# Patient Record
Sex: FEMALE | Race: WHITE | Employment: UNEMPLOYED | URBAN - METROPOLITAN AREA
[De-identification: names, ages, dates, MRNs, and addresses within clinical notes are randomized per-mention and may not be internally consistent; named-entity substitution may affect disease eponyms.]

---

## 2020-07-06 ENCOUNTER — HOSPITAL ENCOUNTER (OUTPATIENT)
Dept: MAMMOGRAPHY | Age: 40
Discharge: HOME | End: 2020-07-06
Attending: PHYSICIAN ASSISTANT
Payer: COMMERCIAL

## 2020-07-06 DIAGNOSIS — Z12.31 VISIT FOR SCREENING MAMMOGRAM: ICD-10-CM

## 2020-07-06 PROCEDURE — 77063 BREAST TOMOSYNTHESIS BI: CPT

## 2020-07-09 ENCOUNTER — HOSPITAL ENCOUNTER (OUTPATIENT)
Dept: MAMMOGRAPHY | Age: 40
Discharge: HOME | End: 2020-07-09
Attending: PHYSICIAN ASSISTANT

## 2020-07-09 DIAGNOSIS — R92.8 ABNORMAL MAMMOGRAM OF RIGHT BREAST: ICD-10-CM

## 2020-07-09 DIAGNOSIS — R92.8 ABNORMAL MAMMOGRAM: ICD-10-CM

## 2020-07-09 PROCEDURE — 76642 ULTRASOUND BREAST LIMITED: CPT

## 2020-07-09 PROCEDURE — 77065 DX MAMMO INCL CAD UNI: CPT

## 2021-01-29 ENCOUNTER — HOSPITAL ENCOUNTER (OUTPATIENT)
Dept: MAMMOGRAPHY | Age: 41
Discharge: HOME | End: 2021-01-29
Attending: PHYSICIAN ASSISTANT

## 2021-01-29 DIAGNOSIS — R92.8 ABNORMAL MAMMOGRAM OF RIGHT BREAST: ICD-10-CM

## 2021-01-29 PROCEDURE — 76642 ULTRASOUND BREAST LIMITED: CPT

## 2024-04-03 ENCOUNTER — OFFICE VISIT (OUTPATIENT)
Age: 44
End: 2024-04-03
Payer: COMMERCIAL

## 2024-04-03 DIAGNOSIS — N63.10 MASS OF RIGHT BREAST, UNSPECIFIED QUADRANT: Primary | ICD-10-CM

## 2024-04-03 PROCEDURE — 19001 PUNCTURE ASPIR CYST BRST EA: CPT | Performed by: SURGERY

## 2024-04-03 PROCEDURE — 19000 PUNCTURE ASPIR CYST BREAST: CPT | Performed by: SURGERY

## 2024-04-03 PROCEDURE — 76642 ULTRASOUND BREAST LIMITED: CPT | Performed by: SURGERY

## 2024-04-03 PROCEDURE — 99203 OFFICE O/P NEW LOW 30 MIN: CPT | Performed by: SURGERY

## 2024-04-03 NOTE — PROGRESS NOTES
HISTORY OF PRESENT ILLNESS  Malissa Jaramillo is a 43 y.o. female     HPI NEW patient consult referred by  Dr. Schumacher for RIGHT breast mass. Patient noticed mass 2 weeks ago with heaviness and full feeling. She states her menstrual cycle came early, after cycle lump got hard and smaller. No skin changes or nipple inversion.       Family History:  Maternal grand mother - breast cancer dx age 60  Father prostate cancer dx age 55      Done @ [facility] last year called for reports.       Review of Systems      Physical Exam       ASSESSMENT and PLAN  {Assessment and Plan Chronic Disease:[id number]}    
patient and patient agrees. All questions were answered.    Total time spent excluding procedural time was 40 minutes.    I, Melanie Shaikh, am scribing for and in the presence of Markie Voss Jr, MD. 4/3/24/10:45 AM EDT    I, Dr. Markie Voss, personally performed the services described in this document as scribed by Melanie Shaikh in my presence, and it is both accurate and complete.